# Patient Record
Sex: FEMALE | Race: WHITE | NOT HISPANIC OR LATINO | Employment: OTHER
[De-identification: names, ages, dates, MRNs, and addresses within clinical notes are randomized per-mention and may not be internally consistent; named-entity substitution may affect disease eponyms.]

---

## 2022-12-16 ENCOUNTER — TRANSCRIBE ORDERS (OUTPATIENT)
Dept: OTHER | Age: 77
End: 2022-12-16

## 2022-12-16 DIAGNOSIS — S76.012A RUPTURE OF GLUTEUS MINIMUS TENDON, LEFT, INITIAL ENCOUNTER: Primary | ICD-10-CM

## 2022-12-29 ENCOUNTER — THERAPY VISIT (OUTPATIENT)
Dept: PHYSICAL THERAPY | Facility: CLINIC | Age: 77
End: 2022-12-29
Payer: COMMERCIAL

## 2022-12-29 DIAGNOSIS — S76.012A RUPTURE OF GLUTEUS MINIMUS TENDON, LEFT, INITIAL ENCOUNTER: ICD-10-CM

## 2022-12-29 DIAGNOSIS — S76.012D: ICD-10-CM

## 2022-12-29 DIAGNOSIS — M25.552 HIP PAIN, LEFT: ICD-10-CM

## 2022-12-29 PROCEDURE — 97161 PT EVAL LOW COMPLEX 20 MIN: CPT | Mod: GP | Performed by: PHYSICAL THERAPIST

## 2022-12-29 PROCEDURE — 97110 THERAPEUTIC EXERCISES: CPT | Mod: GP | Performed by: PHYSICAL THERAPIST

## 2022-12-29 ASSESSMENT — ACTIVITIES OF DAILY LIVING (ADL)
STEPPING_UP_AND_DOWN_CURBS: NO DIFFICULTY AT ALL
WALKING_15_MINUTES_OR_GREATER: SLIGHT DIFFICULTY
GETTING_INTO_AND_OUT_OF_AN_AVERAGE_CAR: NO DIFFICULTY AT ALL
ROLLING_OVER_IN_BED: SLIGHT DIFFICULTY
HOS_ADL_ITEM_SCORE_TOTAL: 49
GOING_UP_1_FLIGHT_OF_STAIRS: MODERATE DIFFICULTY
WALKING_DOWN_STEEP_HILLS: SLIGHT DIFFICULTY
HOS_ADL_HIGHEST_POTENTIAL_SCORE: 64
SITTING_FOR_15_MINUTES: NO DIFFICULTY AT ALL
HOS_ADL_COUNT: 16
GETTING_INTO_AND_OUT_OF_A_BATHTUB: NO DIFFICULTY AT ALL
RECREATIONAL_ACTIVITIES: SLIGHT DIFFICULTY
PUTTING_ON_SOCKS_AND_SHOES: NO DIFFICULTY AT ALL
HOS_ADL_SCORE(%): 76.56
STANDING_FOR_15_MINUTES: SLIGHT DIFFICULTY
DEEP_SQUATTING: NO DIFFICULTY AT ALL
LIGHT_TO_MODERATE_WORK: MODERATE DIFFICULTY
WALKING_UP_STEEP_HILLS: SLIGHT DIFFICULTY
TWISTING/PIVOTING_ON_INVOLVED_LEG: SLIGHT DIFFICULTY
WALKING_INITIALLY: NO DIFFICULTY AT ALL
GOING_DOWN_1_FLIGHT_OF_STAIRS: MODERATE DIFFICULTY
HEAVY_WORK: MODERATE DIFFICULTY

## 2022-12-30 NOTE — PROGRESS NOTES
Physical Therapy Initial Evaluation  Subjective:  Philippe Garrett is a 77 year old female with a left hip condition.    The condition occurred due to a twist and fall.  The condition occurred at home.  This is a chronic condition.    Mechanism/History of injury/symptoms:  9/20/22 patient twisted backward and fell in her home when her foot caught or slipped awkwardly.  The pain is located posterior, lateral and the quality of pain is reported as aching, sharp.  The degree of pain is reported as 5/10. The timing of pain/symptoms is constant, not dependent on time of day. Associated symptoms include: loss of strength, limping    Symptoms are exacerbated by: stairs, walking.  Symptoms are relieved by: rest.  Since onset symptoms are improved from first few days but no change over last few weeks..    Special tests for this condition include: x-ray, MRI.  Previous treatments for this condition include: injection.    General health as reported by patient is good.  Pertinent medical history includes: overweight, numbness/tingling.  Medical allergies include: none.  Other surgeries include: R rotator cuff x2.  Current medications:  sleep    Current occupation: retired teacher, .  Patient's home/work tasks include: computer work, walking, stairs, prolonged standing.  Patient is currently working in normal job with self-imposed restrictions.    Potential barriers to physical therapy: none.  Red flags: none.    Previous level of function: able to manage stairs, stand long periods, walk long distances without left hip pain  Current functional restrictions:  Unable to manage stairs, stand long periods, or walk long distances without left hip pain.    HPI                    Objective:  HIP:    PROM:   L  R   Flexion 120 120   Extension 20 20   Abduction Pain at 30 45   IR 30 60   ER 30 60     Strength:   L R   HIP     Flex 3/5 with lateral pain 4/5   Ext 3+/5 with pain 4/5   Abd 3-/5 with pain 4-/5   KNEE     Flex  5-/5 5/5   Ext 5-/5 5/5     Palpation: left hip tender over glut tendon, ITB/greater trochanter, SIJ    Functional: able to do double leg squat, unable to perform single leg balance on left leg without contralateral pelvic drop    Gait: Contralateral pelvic drop noted bilaterally, antalgic stair management descending>ascending            System    Physical Exam    General     ROS    Assessment/Plan:    Patient is a 77 year old female with left side hip complaints.    Patient has the following significant findings with corresponding treatment plan.                Diagnosis 1:  Left hip pain, glut tendon rupture    Pain -  hot/cold therapy, manual therapy, self management, education and home program  Decreased strength - therapeutic exercise, therapeutic activities and home program  Decreased proprioception - neuro re-education, therapeutic activities and home program  Impaired gait - gait training and home program  Decreased function - therapeutic activities and home program    Cumulative Therapy Evaluation is: Low complexity.    Previous and current functional limitations:  (See Goal Flow Sheet for this information)    Short term and Long term goals: (See Goal Flow Sheet for this information)     Communication ability:  Patient appears to be able to clearly communicate and understand verbal and written communication and follow directions correctly.  Treatment Explanation - The following has been discussed with the patient:   RX ordered/plan of care  Anticipated outcomes  Possible risks and side effects  This patient would benefit from PT intervention to resume normal activities.   Rehab potential is good.    Frequency:  1 X week, once daily  Duration:  for 5 weeks (until surgery to repair tendon)  Discharge Plan:  Achieve all pre-op LTG.  Independent in pre-op home treatment program.  Reach maximal pre-op therapeutic benefit.    Please refer to the daily flowsheet for treatment today, total treatment time and time  spent performing 1:1 timed codes.

## 2022-12-30 NOTE — PROGRESS NOTES
[facility]    OUTPATIENT Physical Therapy ORTHOPEDIC EVALUATION  PLAN OF TREATMENT FOR OUTPATIENT REHABILITATION  (COMPLETE FOR INITIAL CLAIMS ONLY)  Patient's Last Name, First Name, M.I.  YOB: 1945  TamiPhilippe  AJIT    Provider s Name:  [facility]   Medical Record No.  [id number]   Start of Care Date:  12/29/22   Onset Date:       Treatment Diagnosis:  L hip pain, glut tendon rupture Medical Diagnosis:     Rupture of gluteus minimus tendon, left, initial encounter  Hip pain, left  Rupture of gluteus minimus tendon, left, subsequent encounter       Goals:     12/29/22 0500   Body Part   Goals listed below are for Left hip   Goal #1   Goal #1 stairs   Previous Functional Level No restrictions   Current Functional Level Ascend/descend stairs;with a railing   Performance Level 8/10 pain   STG Target Performance Ascend/descend stairs;in a normal reciprocal pattern;with a railing   Performance Level <5/10 pain   Rationale to enter/leave the house safely;to reach upper level of home safely;to reach lower level of home safely;for safe community access to buildings;for safe community ambulaton   Due Date 01/19/23   LTG Target Performance Ascend/descend stairs;in a normal reciprocal pattern;with railing   Performance Level <2/10 pain   Rationale to enter/leave the house safely;to reach upper level of home safely;to reach lower level of home safely;for safe community access to buildings;for safe community ambulaton   Due Date 02/02/23         Therapy Frequency:  1x/week  Predicted Duration of Therapy Intervention:  5 weeks (until surgery)    Beltran Partida, PT                 I CERTIFY THE NEED FOR THESE SERVICES FURNISHED UNDER        THIS PLAN OF TREATMENT AND WHILE UNDER MY CARE .             Physician Signature                Date    X_____________________________________________________                         Certification Date From:  12/29/22   Certification Date To:  02/02/23    Referring Provider:  Andi Bradley    Initial Assessment        See Epic Evaluation SOC Date: 12/29/22

## 2023-01-06 ENCOUNTER — THERAPY VISIT (OUTPATIENT)
Dept: PHYSICAL THERAPY | Facility: CLINIC | Age: 78
End: 2023-01-06
Payer: COMMERCIAL

## 2023-01-06 DIAGNOSIS — M25.552 HIP PAIN, LEFT: Primary | ICD-10-CM

## 2023-01-06 DIAGNOSIS — S76.012D: ICD-10-CM

## 2023-01-06 PROCEDURE — 97110 THERAPEUTIC EXERCISES: CPT | Mod: GP | Performed by: PHYSICAL THERAPIST

## 2023-01-06 PROCEDURE — 97116 GAIT TRAINING THERAPY: CPT | Mod: GP | Performed by: PHYSICAL THERAPIST

## 2023-01-12 ENCOUNTER — THERAPY VISIT (OUTPATIENT)
Dept: PHYSICAL THERAPY | Facility: CLINIC | Age: 78
End: 2023-01-12
Payer: COMMERCIAL

## 2023-01-12 DIAGNOSIS — M25.552 HIP PAIN, LEFT: Primary | ICD-10-CM

## 2023-01-12 DIAGNOSIS — S76.012D: ICD-10-CM

## 2023-01-12 PROCEDURE — 97110 THERAPEUTIC EXERCISES: CPT | Mod: GP | Performed by: PHYSICAL THERAPIST

## 2023-01-12 PROCEDURE — 97116 GAIT TRAINING THERAPY: CPT | Mod: GP | Performed by: PHYSICAL THERAPIST

## 2023-01-19 ENCOUNTER — THERAPY VISIT (OUTPATIENT)
Dept: PHYSICAL THERAPY | Facility: CLINIC | Age: 78
End: 2023-01-19
Payer: COMMERCIAL

## 2023-01-19 DIAGNOSIS — S76.012D: ICD-10-CM

## 2023-01-19 DIAGNOSIS — M25.552 HIP PAIN, LEFT: Primary | ICD-10-CM

## 2023-01-19 PROCEDURE — 97116 GAIT TRAINING THERAPY: CPT | Mod: GP | Performed by: PHYSICAL THERAPIST

## 2023-01-19 PROCEDURE — 97110 THERAPEUTIC EXERCISES: CPT | Mod: 59 | Performed by: PHYSICAL THERAPIST

## 2023-01-19 PROCEDURE — 97530 THERAPEUTIC ACTIVITIES: CPT | Mod: GP | Performed by: PHYSICAL THERAPIST

## 2023-01-24 ENCOUNTER — THERAPY VISIT (OUTPATIENT)
Dept: PHYSICAL THERAPY | Facility: CLINIC | Age: 78
End: 2023-01-24
Payer: COMMERCIAL

## 2023-01-24 DIAGNOSIS — S76.012D: ICD-10-CM

## 2023-01-24 DIAGNOSIS — M25.552 HIP PAIN, LEFT: Primary | ICD-10-CM

## 2023-01-24 PROCEDURE — 97116 GAIT TRAINING THERAPY: CPT | Mod: GP | Performed by: PHYSICAL THERAPIST

## 2023-01-24 PROCEDURE — 97110 THERAPEUTIC EXERCISES: CPT | Mod: GP | Performed by: PHYSICAL THERAPIST

## 2023-01-24 NOTE — PROGRESS NOTES
Subjective:  HPI  Physical Exam                    Objective:  System    Physical Exam    General     ROS    Assessment/Plan:    PROGRESS  REPORT    Progress reporting period is from 12//29/22 to 1/24/23.       SUBJECTIVE  Subjective changes noted by patient:  Philippe reports she feels like she is pretty well prepared for her hip surgery on 1/27/23 in regards to using her walker for getting around her home and to PT.  She still has apprehension about managing stairs but she will not need to do this for at least 2 weeks after her surgery.  She has questions about what she can do to keep her non-surgical limb stretched out and what stretches she will be able to do for her surgical leg.    Current pain level is  2/10.     Previous pain level was 5/10.   Changes in function:  Yes, see above.  Adverse reaction to treatment or activity: None    OBJECTIVE  Changes noted in objective findings:  Yes  Able to independently transfer supine to sit to stand and reverse while maintaining NWB on left LE.    Able to ambulate 50+ feet with standard walker maintaining NWB.    Able to manage 4 steps up and down using walker and railing maintaining TTWB, unable to do so with NWB.     ASSESSMENT/PLAN  Updated problem list and treatment plan: Diagnosis 1:  Left hip pain, pre-op for glut tendon repair    Pain -  home program  Decreased strength - home program  Decreased proprioception - home program  Decreased function - home program  STG/LTGs have been met or progress has been made towards goals:  Yes (See Goal flow sheet completed today.)  Assessment of Progress: Patient has progressed with pre-op PT to level where she is prepared for her surgery.  Self Management Plans:  Patient has been instructed in a home treatment program.  Patient  has been instructed in self management of symptoms.    Philippe continues to require the following intervention to meet STG and LTG's:  HEP until surgery    Recommendations:  This patient is ready to  be discharged from pre-op therapy and continue their home treatment program.    Please refer to the daily flowsheet for treatment today, total treatment time and time spent performing 1:1 timed codes.